# Patient Record
Sex: FEMALE | Race: AMERICAN INDIAN OR ALASKA NATIVE
[De-identification: names, ages, dates, MRNs, and addresses within clinical notes are randomized per-mention and may not be internally consistent; named-entity substitution may affect disease eponyms.]

---

## 2017-06-16 ENCOUNTER — HOSPITAL ENCOUNTER (INPATIENT)
Dept: HOSPITAL 5 - LD | Age: 22
LOS: 3 days | Discharge: HOME | End: 2017-06-19
Attending: OBSTETRICS & GYNECOLOGY | Admitting: OBSTETRICS & GYNECOLOGY
Payer: MEDICAID

## 2017-06-16 DIAGNOSIS — F12.90: ICD-10-CM

## 2017-06-16 DIAGNOSIS — Z3A.40: ICD-10-CM

## 2017-06-16 LAB
HCT VFR BLD CALC: 36 % (ref 30.3–42.9)
HGB BLD-MCNC: 12.2 GM/DL (ref 10.1–14.3)
MCH RBC QN AUTO: 32 PG (ref 28–32)
MCHC RBC AUTO-ENTMCNC: 34 % (ref 30–34)
MCV RBC AUTO: 95 FL (ref 79–97)
PLATELET # BLD: 167 K/MM3 (ref 140–440)
RBC # BLD AUTO: 3.8 M/MM3 (ref 3.65–5.03)
WBC # BLD AUTO: 8.9 K/MM3 (ref 4.5–11)

## 2017-06-16 PROCEDURE — 86901 BLOOD TYPING SEROLOGIC RH(D): CPT

## 2017-06-16 PROCEDURE — 86900 BLOOD TYPING SEROLOGIC ABO: CPT

## 2017-06-16 PROCEDURE — 99211 OFF/OP EST MAY X REQ PHY/QHP: CPT

## 2017-06-16 PROCEDURE — G0008 ADMIN INFLUENZA VIRUS VAC: HCPCS

## 2017-06-16 PROCEDURE — G0463 HOSPITAL OUTPT CLINIC VISIT: HCPCS

## 2017-06-16 PROCEDURE — 85018 HEMOGLOBIN: CPT

## 2017-06-16 PROCEDURE — C9250 ARTISS FIBRIN SEALANT: HCPCS

## 2017-06-16 PROCEDURE — 90471 IMMUNIZATION ADMIN: CPT

## 2017-06-16 PROCEDURE — 86850 RBC ANTIBODY SCREEN: CPT

## 2017-06-16 PROCEDURE — 85014 HEMATOCRIT: CPT

## 2017-06-16 PROCEDURE — 86592 SYPHILIS TEST NON-TREP QUAL: CPT

## 2017-06-16 PROCEDURE — 36415 COLL VENOUS BLD VENIPUNCTURE: CPT

## 2017-06-16 PROCEDURE — 85027 COMPLETE CBC AUTOMATED: CPT

## 2017-06-16 PROCEDURE — 90715 TDAP VACCINE 7 YRS/> IM: CPT

## 2017-06-16 RX ADMIN — SODIUM CHLORIDE, SODIUM LACTATE, POTASSIUM CHLORIDE, AND CALCIUM CHLORIDE SCH MLS/HR: .6; .31; .03; .02 INJECTION, SOLUTION INTRAVENOUS at 20:15

## 2017-06-16 RX ADMIN — SODIUM CHLORIDE, SODIUM LACTATE, POTASSIUM CHLORIDE, AND CALCIUM CHLORIDE SCH MLS/HR: .6; .31; .03; .02 INJECTION, SOLUTION INTRAVENOUS at 23:49

## 2017-06-16 RX ADMIN — OXYTOCIN SCH MLS/HR: 10 INJECTION, SOLUTION INTRAMUSCULAR; INTRAVENOUS at 18:29

## 2017-06-16 RX ADMIN — OXYTOCIN SCH MLS/HR: 10 INJECTION, SOLUTION INTRAMUSCULAR; INTRAVENOUS at 17:30

## 2017-06-16 RX ADMIN — OXYTOCIN SCH MLS/HR: 10 INJECTION, SOLUTION INTRAMUSCULAR; INTRAVENOUS at 16:43

## 2017-06-16 RX ADMIN — SODIUM CHLORIDE, SODIUM LACTATE, POTASSIUM CHLORIDE, AND CALCIUM CHLORIDE SCH MLS/HR: .6; .31; .03; .02 INJECTION, SOLUTION INTRAVENOUS at 16:42

## 2017-06-16 NOTE — PROGRESS NOTE
Assessment and Plan





- Patient Problems


(1) 40 weeks gestation of pregnancy


Current Visit: Yes   Status: Acute   





(2) GBS (group B Streptococcus carrier), +RV culture, currently pregnant


Current Visit: Yes   Status: Acute   


Plan to address problem: 


-s/p one dose of antibx. Second dose due at 2030 to 2100 pm.








(3) Non-reassuring electronic fetal monitoring tracing


Current Visit: Yes   Status: Acute   


Plan to address problem: 


-con't IOL. currently with Cat I tracing


-all questions were addressed and answered








Subjective





- Subjective


Date of service: 06/16/17


Principal diagnosis: 40.6 weeks for IOL


Interval history: 


doing well. +pain with contractions but does not need pain meds at this time.





Patient reports: fetal movement normal, no new complaints, no loss of fluid, no 

vaginal bleeding





Objective





- Vital Signs


Vital Signs: 


 Vital Signs - 12hr











  06/16/17 06/16/17 06/16/17





  15:53 16:14 17:04


 


Temperature  98.4 F 


 


Pulse Rate 118 H  93 H


 


Blood Pressure 111/81  123/70


 


Blood Pressure  111/81 





[Left Arm]   


 


O2 Sat by Pulse 97  





Oximetry   














  06/16/17 06/16/17 06/16/17





  17:17 17:31 17:46


 


Temperature   


 


Pulse Rate 94 H 88 88


 


Blood Pressure 119/80 114/77 110/74


 


Blood Pressure   





[Left Arm]   


 


O2 Sat by Pulse   





Oximetry   














  06/16/17 06/16/17 06/16/17





  18:02 18:18 18:31


 


Temperature   


 


Pulse Rate 75 82 79


 


Blood Pressure 118/74 114/68 112/71


 


Blood Pressure   





[Left Arm]   


 


O2 Sat by Pulse   





Oximetry   














  06/16/17





  18:47


 


Temperature 


 


Pulse Rate 75


 


Blood Pressure 110/72


 


Blood Pressure 





[Left Arm] 


 


O2 Sat by Pulse 





Oximetry 














- Exam


FHR: category 1


Cervical Dilatation: 4


Cervical Effacement Percentage: 70


Fetal station: -1


Uterine Contraction Pattern: Regular


Uterine Tone Measurement Phase: Resting


Uterine Contraction Intensity: Mild





- Labs


Labs: 


 Laboratory Results - last 24 hr











  06/16/17 06/16/17





  16:00 16:00


 


WBC  8.9 


 


RBC  3.80 


 


Hgb  12.2 


 


Hct  36.0 


 


MCV  95 


 


MCH  32 


 


MCHC  34 


 


RDW  13.8 


 


Plt Count  167 


 


Blood Type   B POSITIVE


 


Antibody Screen   TNR


 


FERNANDO Antibody Screen   Negative

## 2017-06-16 NOTE — EVENT NOTE
Date: 06/16/17


Called by nursing staff due to decels. Pt noted to be decreased by after 

epidural was given. Pt examined and cx is still unchanged. Pt advised that her 

labor is dysfunctional because after an hours of adequate contractions she has 

made very little cx and no change in the last hour. Pitocis d/c . Ephedrin was 

given and BP improved as well as fetal tracing. I d/w operative delivery via c/

s and she still desires haritha at this time. I stressed that currently the labor 

is dysfunctional and without the pitocin she will likely not have cx change 

however, if the tracing becomes cat II again w/o resolution with corrective 

measures again c/s would be recommended. Pt and family expressed understanding 

and questions were addressed and answered. I will have pt to sign c/s consents 

at this time and RN made aware of potential risk of c/s for fetal intolerance 

should now cat I tracing become cat II/III and pt is still remote from 

delivery. Several minutes spent at beside discussing options with pt and her 

family.

## 2017-06-16 NOTE — HISTORY AND PHYSICAL REPORT
History of Present Illness


Date of examination: 17


Date of admission: 


17 15:25





Chief complaint: 


nonreassuring fht in the office at post-dates testing appointment. FHT with 2 

minute late decel and several variables decels. Plan reviewed with Dr. Kurtz 

to begin IOL today.





History of present illness: 


EDC Confirmation:  06/10/2017








Past Pregnancy History 


   :      1


   Term Births:      0


   Premature Births:   0


   Living Children:   0


   Para:         0


   Mult. Births:      0


   Prev :   0


   Prev.  attempt?   0


   Aborta:      0


   Elect. Ab:      0


   Spont. Ab:      0


   Ectopics:      0








Past Medical History:


   Negative Past Medical History





Past Surgical History:


   Negative Past Surgical History








Social History:


   Unemployed


   Patient is single








Risk Factors: 





Smoked Tobacco Use:  Never smoker


Drug use:  yes


   Substance:  marijuana


   Comments:  Last use 2016


HIV high-risk behavior:  low risk


Alcohol use:  no








Past Medical History 


Surgery (Non-gyn): Negative Past Surgical History





Abnormal PAP: negative


Uterine Anomaly: negative





Social Hx: Unemployed


Patient is single








Infection History 


Hx of STD: chlamydia


HIV Risk Eval: low risk


Hepatitis B Risk Eval: low risk


Personal hx. of genital herpes: no


Partner hx. of genital herpes: no


Infection History Comments: +GC





Genetic History 


 Congenital Heart Defect:


    Mom: no  Dad: no


Canavan Disease:


    Mom: no  Dad: no


Thalassemia


    Mom: no  Dad: no


Neural Tube Defect


    Mom: no  Dad: no


Down's Syndrome


    Mom: no  Dad: no


Del-Sachs


    Mom: no  Dad: no


Sickle Cell Disease/Trait


    Mom: no  Dad: no


Hemophilia


    Mom: no  Dad: no


Muscular Dystrophy


    Mom: no  Dad: no


Cystic Fibrosis


    Mom: no  Dad: no


Chaves Chorea


    Mom: no  Dad: no


Mental Retardation


    Mom: no  Dad: no


Fragile X


    Mom: no  Dad: no


Other Genetic/Chromosomal Disorder


    Mom: no  Dad: no


Child w/other birth defect


    Mom: no  Dad: no





Enviromental Exposures 


Xray Exposure: no


Medication, drug, or alcohol use since LMP: no


Chemical/Other Exposure: no


Exposure to Cat Liter: no


Hx of Parvovirus (Fifth Disease): no


Active Medications (reviewed today):


PRENATAL FORMULA 27-1 MG ORAL TABS (PRENATAL VIT-FE FUMARATE-FA) 1 po q day as 

directed


PNV TABS 29-1 TABS (PRENATAL VIT-IRON CARBONYL-FA TABS) 





Current Allergies (reviewed today):


No known allergies








Past History


Past Medical History: no pertinent history


Past Surgical History: no surgical history





- Obstetrical History


Expected Date of Delivery: 06/10/17


Actual Gestation: 40 Week(s) 6 Day(s) 


: 1


Para: 0


Hx # Term Pregnancies: 0


Spontaneous Abortions: 0


Induced : 0


Number of Living Children: 0





Medications and Allergies


 Allergies











Allergy/AdvReac Type Severity Reaction Status Date / Time


 


No Known Allergies Allergy   Unverified 05/13/15 18:58











 Home Medications











 Medication  Instructions  Recorded  Confirmed  Last Taken  Type


 


Naproxen [Naprosyn TAB] 500 mg PO Q12H #20 tablet 05/13/15  Unknown Rx


 


traMADol [Ultram 50 MG tab] 50 mg PO Q6HR PRN #12 tablet 05/13/15  Unknown Rx











Active Meds: 


Active Medications





Ephedrine Sulfate (Ephedrine Sulfate)  10 mg IV Q2M PRN


   PRN Reason: Hypotension


   Stop: 17 15:39


Fentanyl (Sublimaze)  100 mcg IV Q2H PRN


   PRN Reason: Labor Pain


Ampicillin Sodium (Polycillin/Ns 1 Gm/50 Ml)  1 gm in 50 mls @ 100 mls/hr IV 

Q4HR GUSTAVO


   PRN Reason: Protocol


Ampicillin Sodium (Polycillin/Ns 2 Gm/100 Ml)  2 gm in 100 mls @ 100 mls/hr IV 

ONCE ONE


   PRN Reason: Protocol


   Stop: 17 16:33


Lactated Ringer's (Lactated Ringers)  1,000 mls @ 125 mls/hr IV AS DIRECT GUSTAVO


Oxytocin/Sodium Chloride (Pitocin/Ns 20 Unit/1000ml Drip)  20 units in 1,000 

mls @ 125 mls/hr IV AS DIRECT GUSTAVO


Oxytocin/Sodium Chloride (Pitocin/Ns 30 Unit/500ml)  30 units in 500 mls @ 4 mls

/hr IV TITR GUSTAVO


   PRN Reason: Protocol


Lidocaine (Xylocaine 2%)  20 ml INFILTRATI ONCE ONE


   Stop: 17 15:35


Mineral Oil (Mineral Oil)  30 ml PO QHS PRN


   PRN Reason: Constipation


Ondansetron HCl (Zofran)  4 mg IV Q8H PRN


   PRN Reason: Nausea And Vomiting


Terbutaline Sulfate (Brethine)  0.25 mg SUB-Q ONCE PRN


   PRN Reason: Hyperstimulation/Hypertonicity


   Stop: 17 15:35


Terbutaline Sulfate (Brethine)  0.25 mg IVP ONCE PRN


   PRN Reason: Hyperstimulation/Hypertonicity


   Stop: 17 15:35











Review of Systems


All systems: negative





- Physical Exam


Breasts: Positive: normal


Cardiovascular: Regular rate


Lungs: Positive: Clear to auscultation, Normal air movement


Abdomen: Positive: normal appearance, soft


Genitourinary (Female): Positive: normal external genitalia, normal perenium


Vulva: both: normal


Vagina: Positive: normal moisture


Uterus: Positive: normal size, normal contour


Anus/Rectum: Positive: normal perianal skin


Extremities: Positive: normal


Deep Tendon Reflex Grade: Normal +2





- Obstetrical


Uterine Contraction Monitor Mode: External


Cervical Dilatation: 3


Cervical Effacement Percentage: 70


Fetal station: -1


Uterine Contraction Pattern: Irregular


Uterine Tone Measurement Phase: Contraction


Uterine Contraction Intensity: Moderate





Results


All other labs normal.





Blood Type: B  (2016)


Rh Type: positive  (2016)


Rh Antibody Screen: negative  (2016)


Hgb: 12.7  (2016)


Hct: 36.0  (2016)


Rubella: immune  (2016)


RPR: nonreactive  (2016)


Hep B Surface Antigen: negative  (2016)


HIV: negative  (2016)


1 Hour GTT: 96  (2017)








Assessment and Plan


 22y/o primip sent from office for IOL d/t non reassuring fht with late decels. 

GBS +, favorable cervix. EFW today 7#14oz, BREA 20.  Plan for pit induction. 

orders in EMR. 








- Patient Problems


(1) 40 weeks gestation of pregnancy


Current Visit: Yes   Status: Acute   





(2) GBS (group B Streptococcus carrier), +RV culture, currently pregnant


Current Visit: Yes   Status: Acute   


Plan to address problem: 


Ampicillin q4h until delivered 








(3) Non-reassuring electronic fetal monitoring tracing


Current Visit: Yes   Status: Acute   


Plan to address problem: 


favorable cervix @ 40+6 weeks


Pitocin IOL


Cont efm/toco

## 2017-06-16 NOTE — PROGRESS NOTE
Assessment and Plan





- Patient Problems


(1) 40 weeks gestation of pregnancy


Current Visit: Yes   Status: Acute   





(2) GBS (group B Streptococcus carrier), +RV culture, currently pregnant


Current Visit: Yes   Status: Acute   





(3) Non-reassuring electronic fetal monitoring tracing


Current Visit: Yes   Status: Acute   





Subjective





- Subjective


Date of service: 06/16/17


Principal diagnosis: 40.6 weeks for IOL


Interval history: 


Pt doing well s/p epidrual and pain is well controlled. AROM with clear fluid 

and ISE and IUPC placed w/o difficulty with clear fluid noted in the cath.


Patient reports: fetal movement normal, no new complaints, no loss of fluid, no 

vaginal bleeding





Objective





- Vital Signs


Vital Signs: 


 Vital Signs - 12hr











  06/16/17 06/16/17 06/16/17





  15:53 16:14 17:04


 


Temperature  98.4 F 


 


Pulse Rate 118 H  93 H


 


Pulse Rate [   





Left From   





Monitor]   


 


Respiratory   





Rate   


 


Blood Pressure 111/81  123/70


 


Blood Pressure  111/81 





[Left Arm]   


 


O2 Sat by Pulse 97  





Oximetry   














  06/16/17 06/16/17 06/16/17





  17:17 17:31 17:46


 


Temperature   


 


Pulse Rate 94 H 88 88


 


Pulse Rate [   





Left From   





Monitor]   


 


Respiratory   





Rate   


 


Blood Pressure 119/80 114/77 110/74


 


Blood Pressure   





[Left Arm]   


 


O2 Sat by Pulse   





Oximetry   














  06/16/17 06/16/17 06/16/17





  18:02 18:18 18:31


 


Temperature   


 


Pulse Rate 75 82 79


 


Pulse Rate [   





Left From   





Monitor]   


 


Respiratory   





Rate   


 


Blood Pressure 118/74 114/68 112/71


 


Blood Pressure   





[Left Arm]   


 


O2 Sat by Pulse   





Oximetry   














  06/16/17 06/16/17 06/16/17





  18:47 19:02 19:16


 


Temperature   


 


Pulse Rate 75 77 80


 


Pulse Rate [   





Left From   





Monitor]   


 


Respiratory   





Rate   


 


Blood Pressure 110/72 116/74 118/77


 


Blood Pressure   





[Left Arm]   


 


O2 Sat by Pulse   





Oximetry   














  06/16/17 06/16/17 06/16/17





  19:31 19:52 19:57


 


Temperature   


 


Pulse Rate 78 81 74


 


Pulse Rate [   





Left From   





Monitor]   


 


Respiratory   





Rate   


 


Blood Pressure 121/85  


 


Blood Pressure   





[Left Arm]   


 


O2 Sat by Pulse  97 97





Oximetry   














  06/16/17 06/16/17 06/16/17





  20:01 20:02 20:07


 


Temperature   


 


Pulse Rate 67 73 84


 


Pulse Rate [   





Left From   





Monitor]   


 


Respiratory   





Rate   


 


Blood Pressure 118/76  


 


Blood Pressure   





[Left Arm]   


 


O2 Sat by Pulse  97 98





Oximetry   














  06/16/17 06/16/17 06/16/17





  20:12 20:15 20:16


 


Temperature   


 


Pulse Rate 70 76 74


 


Pulse Rate [   





Left From   





Monitor]   


 


Respiratory   





Rate   


 


Blood Pressure   121/77


 


Blood Pressure   





[Left Arm]   


 


O2 Sat by Pulse 97 94 





Oximetry   














  06/16/17 06/16/17 06/16/17





  20:30 20:31 20:35


 


Temperature   


 


Pulse Rate 77 72 101 H


 


Pulse Rate [   





Left From   





Monitor]   


 


Respiratory   





Rate   


 


Blood Pressure  112/82 


 


Blood Pressure   





[Left Arm]   


 


O2 Sat by Pulse 100  96





Oximetry   














  06/16/17 06/16/17 06/16/17





  20:40 20:42 20:44


 


Temperature   98.4 F


 


Pulse Rate 68 82 86


 


Pulse Rate [   85





Left From   





Monitor]   


 


Respiratory   18





Rate   


 


Blood Pressure  110/76 107/74


 


Blood Pressure   110/76





[Left Arm]   


 


O2 Sat by Pulse 99  97





Oximetry   














  06/16/17 06/16/17 06/16/17





  20:45 20:46 20:48


 


Temperature   


 


Pulse Rate 73 76 


 


Pulse Rate [   





Left From   





Monitor]   


 


Respiratory   





Rate   


 


Blood Pressure  110/75 106/69


 


Blood Pressure   





[Left Arm]   


 


O2 Sat by Pulse 97  





Oximetry   














  06/16/17 06/16/17 06/16/17





  20:50 20:52 20:54


 


Temperature   


 


Pulse Rate 87 75 78


 


Pulse Rate [   





Left From   





Monitor]   


 


Respiratory   





Rate   


 


Blood Pressure 103/71 109/71 105/69


 


Blood Pressure   





[Left Arm]   


 


O2 Sat by Pulse 95  





Oximetry   














  06/16/17 06/16/17 06/16/17





  20:55 20:56 20:58


 


Temperature   


 


Pulse Rate 85 80 81


 


Pulse Rate [   





Left From   





Monitor]   


 


Respiratory   





Rate   


 


Blood Pressure  105/68 104/67


 


Blood Pressure   





[Left Arm]   


 


O2 Sat by Pulse 97  





Oximetry   














  06/16/17 06/16/17 06/16/17





  21:00 21:05 21:08


 


Temperature   


 


Pulse Rate 72 69 70


 


Pulse Rate [   





Left From   





Monitor]   


 


Respiratory   





Rate   


 


Blood Pressure   107/69


 


Blood Pressure   





[Left Arm]   


 


O2 Sat by Pulse 96 97 





Oximetry   














  06/16/17 06/16/17 06/16/17





  21:10 21:15 21:20


 


Temperature   


 


Pulse Rate 82 69 73


 


Pulse Rate [   





Left From   





Monitor]   


 


Respiratory   





Rate   


 


Blood Pressure   


 


Blood Pressure   





[Left Arm]   


 


O2 Sat by Pulse 98 98 98





Oximetry   














  06/16/17 06/16/17 06/16/17





  21:25 21:30 21:35


 


Temperature   


 


Pulse Rate 75 72 69


 


Pulse Rate [   





Left From   





Monitor]   


 


Respiratory   





Rate   


 


Blood Pressure   


 


Blood Pressure   





[Left Arm]   


 


O2 Sat by Pulse 97 98 97





Oximetry   














  06/16/17 06/16/17 06/16/17





  21:40 21:45 21:48


 


Temperature   


 


Pulse Rate 71 67 69


 


Pulse Rate [   





Left From   





Monitor]   


 


Respiratory   





Rate   


 


Blood Pressure   111/66


 


Blood Pressure   





[Left Arm]   


 


O2 Sat by Pulse 97 97 





Oximetry   














- Exam


FHR: category 1


Cervical Dilatation: 5.5


Cervical Effacement Percentage: 95


Fetal station: -1 to 0


Uterine Contraction Pattern: Regular


Uterine Tone Measurement Phase: Resting


Uterine Contraction Intensity: Moderate





- Labs


Labs: 


 Laboratory Results - last 24 hr











  06/16/17 06/16/17





  16:00 16:00


 


WBC  8.9 


 


RBC  3.80 


 


Hgb  12.2 


 


Hct  36.0 


 


MCV  95 


 


MCH  32 


 


MCHC  34 


 


RDW  13.8 


 


Plt Count  167 


 


Blood Type   B POSITIVE


 


Antibody Screen   TNR


 


FERNANDO Antibody Screen   Negative

## 2017-06-16 NOTE — ANESTHESIA CONSULTATION
Anesthesia Consult and Med Hx


Date of service: 06/16/17





- Airway


Anesthetic Teeth Evaluation: Good


ROM Head & Neck: Adequate


Mental/Hyoid Distance: Adequate


Mallampati Class: Class II


Intubation Access Assessment: Probably Good





- Pulmonary Exam


CTA: Yes





- Cardiac Exam


Cardiac Exam: RRR





- Pre-Operative Health Status


ASA Pre-Surgery Classification: ASA2


Proposed Anesthetic Plan: Epidural, Spinal





- Pulmonary


Hx Asthma: No





- Cardiovascular System


Hx Hypertension: No





- Central Nervous System


Hx Seizures: No


Hx Psychiatric Problems: No





- Endocrine


Hx Renal Disease: No


Hx Hypothyroidism: No


Hx Hyperthyroidism: No





- Hematic


Hx Anemia: No


Hx Sickle Cell Disease: No





- Other Systems


Hx Alcohol Use: No





- Additional Comments


Anesthesia Medical History Comments: +IUP AT 40.6

## 2017-06-17 LAB
HCT VFR BLD CALC: 33.2 % (ref 30.3–42.9)
HGB BLD-MCNC: 11.3 GM/DL (ref 10.1–14.3)

## 2017-06-17 RX ADMIN — HYDROCODONE BITARTRATE AND ACETAMINOPHEN PRN EACH: 5; 325 TABLET ORAL at 22:51

## 2017-06-17 RX ADMIN — IBUPROFEN PRN MG: 800 TABLET, FILM COATED ORAL at 18:28

## 2017-06-17 RX ADMIN — FERROUS SULFATE TAB 325 MG (65 MG ELEMENTAL FE) SCH MG: 325 (65 FE) TAB at 16:27

## 2017-06-17 RX ADMIN — CEFAZOLIN SCH MLS/HR: 330 INJECTION, POWDER, FOR SOLUTION INTRAMUSCULAR; INTRAVENOUS at 10:53

## 2017-06-17 RX ADMIN — CEFAZOLIN SCH MLS/HR: 330 INJECTION, POWDER, FOR SOLUTION INTRAMUSCULAR; INTRAVENOUS at 18:32

## 2017-06-17 RX ADMIN — Medication SCH EACH: at 16:27

## 2017-06-17 RX ADMIN — HYDROCODONE BITARTRATE AND ACETAMINOPHEN PRN EACH: 5; 325 TABLET ORAL at 14:07

## 2017-06-17 NOTE — OPERATIVE REPORT
Operative Report


Operative Report: 


Date of procedure: 2017





Pre-operative diagnosis: 41 weeks' gestation


                                 To be strep positive


                                 Failure to progress





Post-operative diagnosis: Same +1+ meconium at the time of delivery





Procedure name(s): Primary low transverse  section via Pfannenstiel 

skin incision





Surgeon: Dr. Kurtz





Assistant: Certified surgical scrub assistant





Anesthesia: Epidural





EBL: 700 mL





Urine output: 500 mL of clear urine at the end of the procedure 





Fluids: 1500 mL





Findings: Liveborn male infant weight 7 lbs. 9 oz. Apgars of 7 and 8 at one and 

5 minutes


             1+ meconium noted at time of delivery


             Grossly normal fallopian tubes and ovaries bilaterally





Indications: Patient sent to the office for induction of labor due to having 

late decelerations on nonstress tests in the office today.  Patient progressed 

approximately 5 cm 90% efface and -1-0 station.  Patient's cervical exam 

remained unchanged for approximately 4 hours with cervical swelling noted in 

effacement decreasing to approximately 70%.  It was also noted.  As at this 

time that primary  section was recommended due to dysfunctional labor 

and failure to progress.  Patient agreed to  section at this time.  All 

risks benefits and alternatives were discussed with the patient.  Consents were 

signed and placed on the chart.





Procedure: Patient was taking to the operating room.  Patient was then prepped 

and draped in sterile fashion after anesthesia was found to be adequate.  A low 

transverse skin incision was made with the scalpel and carried down to the 

underlying layer of fascia with the Bovie.  The fascia was then incised in the 

midline and this incision was extended bilaterally with the Bovie.  The 

superior aspect of the fascia was grasped with Kocher clamps tented upward and 

dissected off of the anterior rectus muscles with the scalpel.  In similar 

fashion the inferior aspect of the fascia was grasped with Kocher clamps tented 

upward and dissected off of the anterior rectus muscles.  The rectus muscles 

were then bluntly divided in the midline.  The peritoneum was identified and 

entered into sharply.  The bladder blade was placed.  The bladder flap was 

created using the Metzenbaum scissors.  The bladder blade was replaced.  A 

lower transverse uterine incision was made with the scalpel and extended 

bilaterally with the bandage scissors.  Entry into the uterus yielded slightly 

stained meconium fluid.  The infant's head was then delivered atraumatically.  

The anterior shoulder and rest of infant delivered without difficulty.  The 

umbilical cord was clamped x2.  The cord was cut.  The infant was then placed 

in sterile bassinet.  The cord blood was collected.  The placenta was manually 

extracted in its entirety.  The uterus was exteriorized and cleared of all 

clots and debris.  The uterine incision was closed using 0 Vicryl in a running 

locking fashion.  A second imbricating layer of the same suture was then 

created.  The posterior cul-de-sac was copiously irrigated.  The uterus was 

returned to the abdomen.  Tisseel placed along the uterine incision with 

excellent hemostasis noted The gutters were also irrigated.  The anterior 

rectus muscles were reapproximated using 3-0 Vicryl.  The anterior rectus 

fascia was reapproximated using 0 Vicryl in a running fashion.  The 

subcuticular fat was reapproximated using 2-0 Vicryl in a running fashion.  The 

skin was reapproximated with 4-0 Monocryl with subcutaneous stitch.  The 

patient tolerated the procedure well.  Sponge lap and needle counts were all 

correct x3.  Patient was taken to the recovery room awake and in stable 

condition.

## 2017-06-18 RX ADMIN — IBUPROFEN PRN MG: 800 TABLET, FILM COATED ORAL at 23:44

## 2017-06-18 RX ADMIN — Medication SCH EACH: at 10:33

## 2017-06-18 RX ADMIN — HYDROCODONE BITARTRATE AND ACETAMINOPHEN PRN EACH: 5; 325 TABLET ORAL at 23:45

## 2017-06-18 RX ADMIN — IBUPROFEN PRN MG: 800 TABLET, FILM COATED ORAL at 17:44

## 2017-06-18 RX ADMIN — IBUPROFEN PRN MG: 800 TABLET, FILM COATED ORAL at 05:40

## 2017-06-18 RX ADMIN — HYDROCODONE BITARTRATE AND ACETAMINOPHEN PRN EACH: 5; 325 TABLET ORAL at 05:39

## 2017-06-18 RX ADMIN — IBUPROFEN PRN MG: 800 TABLET, FILM COATED ORAL at 18:21

## 2017-06-18 RX ADMIN — HYDROCODONE BITARTRATE AND ACETAMINOPHEN PRN EACH: 5; 325 TABLET ORAL at 18:20

## 2017-06-18 RX ADMIN — HYDROCODONE BITARTRATE AND ACETAMINOPHEN PRN EACH: 5; 325 TABLET ORAL at 10:33

## 2017-06-18 RX ADMIN — IBUPROFEN PRN MG: 800 TABLET, FILM COATED ORAL at 12:24

## 2017-06-18 RX ADMIN — HYDROCODONE BITARTRATE AND ACETAMINOPHEN PRN EACH: 5; 325 TABLET ORAL at 17:44

## 2017-06-18 RX ADMIN — FERROUS SULFATE TAB 325 MG (65 MG ELEMENTAL FE) SCH MG: 325 (65 FE) TAB at 10:33

## 2017-06-18 NOTE — PROGRESS NOTE
Assessment and Plan


 patient doing well, c/o normal discomforts. encouraged use of pain medications 

as needed. Lochia scant, dressing removed - incision D&I. H&H stable 11.3/33.2, 

VSSAF. Encouraged increase activity and shower today. Continue postop pathway.








- Patient Problems


(1)  delivery delivered


Current Visit: Yes   Status: Acute   





Subjective





- Subjective


Date of service: 17


Principal diagnosis: postop day #1 s/p primary c/s


Interval history: 


EDC Confirmation:  06/10/2017








Past Pregnancy History 


   :      1


   Term Births:      0


   Premature Births:   0


   Living Children:   0


   Para:         0


   Mult. Births:      0


   Prev :   0


   Prev.  attempt?   0


   Aborta:      0


   Elect. Ab:      0


   Spont. Ab:      0


   Ectopics:      0








Past Medical History:


   Negative Past Medical History





Past Surgical History:


   Negative Past Surgical History








Social History:


   Unemployed


   Patient is single








Risk Factors: 





Smoked Tobacco Use:  Never smoker


Drug use:  yes


   Substance:  marijuana


   Comments:  Last use 2016


HIV high-risk behavior:  low risk


Alcohol use:  no








Past Medical History 


Surgery (Non-gyn): Negative Past Surgical History





Abnormal PAP: negative


Uterine Anomaly: negative





Social Hx: Unemployed


Patient is single








Infection History 


Hx of STD: chlamydia


HIV Risk Eval: low risk


Hepatitis B Risk Eval: low risk


Personal hx. of genital herpes: no


Partner hx. of genital herpes: no


Infection History Comments: +GC





Genetic History 


 Congenital Heart Defect:


    Mom: no  Dad: no


Canavan Disease:


    Mom: no  Dad: no


Thalassemia


    Mom: no  Dad: no


Neural Tube Defect


    Mom: no  Dad: no


Down's Syndrome


    Mom: no  Dad: no


Del-Sachs


    Mom: no  Dad: no


Sickle Cell Disease/Trait


    Mom: no  Dad: no


Hemophilia


    Mom: no  Dad: no


Muscular Dystrophy


    Mom: no  Dad: no


Cystic Fibrosis


    Mom: no  Dad: no


Luce Chorea


    Mom: no  Dad: no


Mental Retardation


    Mom: no  Dad: no


Fragile X


    Mom: no  Dad: no


Other Genetic/Chromosomal Disorder


    Mom: no  Dad: no


Child w/other birth defect


    Mom: no  Dad: no





Enviromental Exposures 


Xray Exposure: no


Medication, drug, or alcohol use since LMP: no


Chemical/Other Exposure: no


Exposure to Cat Liter: no


Hx of Parvovirus (Fifth Disease): no


Active Medications (reviewed today):


PRENATAL FORMULA 27-1 MG ORAL TABS (PRENATAL VIT-FE FUMARATE-FA) 1 po q day as 

directed


PNV TABS 29-1 TABS (PRENATAL VIT-IRON CARBONYL-FA TABS) 





Current Allergies (reviewed today):


No known allergies





Patient reports: appetite normal, voiding normally, pain well controlled, flatus

, ambulating normally, no dizzy ambulation, no nauseated


[place]: doing well, bottle feeding (breast and bottle feeding)





Objective





- Vital Signs


Latest vital signs: 


 Vital Signs











  Temp Pulse Resp BP


 


 17 08:45  98.5 F  77  20  97/59


 


 17 00:10  98.1 F  77  18  100/65


 


 17 20:15  98.6 F  86  18  109/66


 


 17 16:30  98.4 F  78  16  97/65


 


 17 13:10  98.9 F  92 H  18  98/63








 Intake and Output











 17





 22:59 06:59 14:59


 


Intake Total 920 1480 120


 


Output Total 1100  600


 


Balance -180 1480 -480


 


Intake:   


 


   1000 


 


    D5lr 1,000 ml @ 125 mls/ 800 1000 





    hr IV AS DIRECT GUSTAVO Rx#:   





    [id number]   


 


  Oral 120 480 120


 


Output:   


 


  Urine 1100  600


 


    Void 1100  600


 


Other:   


 


  Total, Intake Amount 120 240 120


 


  Total, Output Amount 400  600


 


  # Voids   


 


    Void 1 1 1














- Exam


Breasts: Present: normal, breastfeeding


Cardiovascular: Present: Regular rate


Lungs: Present: Clear to auscultation, Normal air movement


Abdomen: Present: normal appearance, soft, normal bowel sounds


Uterus: Present: normal, firm, fundal height below umbilicus


Extremities: Present: normal


Incision: Present: normal, dry, intact

## 2017-06-19 VITALS — SYSTOLIC BLOOD PRESSURE: 112 MMHG | DIASTOLIC BLOOD PRESSURE: 62 MMHG

## 2017-06-19 RX ADMIN — HYDROCODONE BITARTRATE AND ACETAMINOPHEN PRN EACH: 5; 325 TABLET ORAL at 09:33

## 2017-06-19 RX ADMIN — IBUPROFEN PRN MG: 800 TABLET, FILM COATED ORAL at 12:11

## 2017-06-19 NOTE — DISCHARGE SUMMARY
Providers





- Providers


Date of Admission: 


17 15:25





Date of discharge: 17 (pt asks to d/c today if possible.)


Attending physician: 


ARNAV VALIENTE





Primary care physician: 


ARNAV VALIENTE








Hospitalization


Reason for admission: induction of labor


Delivery: 


Procedure: primary low transverse


Episiotomy: none


Laceration: none


Incision: dry, intact


Other postpartum procedures: none


Postpartum complications: none


Discharge diagnosis: IUP at term delivered


 baby: male


Hospital course: 


uncomplicated  section after failed IOL





Pt w/o complaint Request d/c today VSS FF below umb Lochia scant Incision D&I H&

H  Pt is asymptomatic Doing well s/p  section P: d/c today with 

instructions RTO 1 week postop care and circ of NB. RX provided.





Condition at discharge: Good


Disposition: DC-01 TO HOME OR SELFCARE





- Discharge Diagnoses


(1)  delivery delivered


Status: Acute   Comment: rto 1 week postop care   





Plan





- Discharge Medications


Prescriptions: 


Ibuprofen [Motrin] 800 mg PO Q8HR PRN #30 tablet


 PRN Reason: Pain


Lidocain2.5%/Prilocai2.5% [Emla] 2 gm TP ONCE #1 tube


oxyCODONE /ACETAMINOPHEN [Percocet 5/325] 1 tab PO Q4HR #30 tab





- Provider Discharge Summary


Activity: routine, no sex for 6 weeks, no heavy lifting 4 weeks, no strenuous 

exercise


Diet: routine


Instructions: routine


Additional instructions: 


[]  Smoking cessation referral if applicable(refer to patient education folder 

for contact #)


[]  Refer to [facility] Booklet








Call your doctor immediately for:


* Fever > 100.5


* Heavy vaginal bleeding ( >1 pad per hour)


* Severe persistent headache


* Shortness of breath


* Reddened, hot, painful area to leg or breast


* Drainage or odor from incision.





* Keep incision clean and dry at all times and follow doctor's instructions 

regarding bathing/showering











- Follow up plan


Follow up: 


ARNAV VALIENTE MD [Primary Care Provider] - 7 Days


(Congratulations!


Please call [phone] to schedule your postoperative visit and your son's 

circumcision. Bring the EMLA cream with you to his visit. 


Take your medication as prescribed.


Call with concerns.)

## 2020-01-08 ENCOUNTER — HOSPITAL ENCOUNTER (EMERGENCY)
Dept: HOSPITAL 5 - ED | Age: 25
LOS: 1 days | Discharge: HOME | End: 2020-01-09
Payer: SELF-PAY

## 2020-01-08 VITALS — DIASTOLIC BLOOD PRESSURE: 61 MMHG | SYSTOLIC BLOOD PRESSURE: 96 MMHG

## 2020-01-08 DIAGNOSIS — J02.9: ICD-10-CM

## 2020-01-08 DIAGNOSIS — J20.9: ICD-10-CM

## 2020-01-08 DIAGNOSIS — F12.10: ICD-10-CM

## 2020-01-08 DIAGNOSIS — Z79.899: ICD-10-CM

## 2020-01-08 DIAGNOSIS — J06.9: Primary | ICD-10-CM

## 2020-01-09 NOTE — EMERGENCY DEPARTMENT REPORT
ED General Adult HPI





- General


Chief complaint: Sore Throat


Stated complaint: HEAD/THROAT PAIN


Source: patient


Mode of arrival: Ambulatory


Limitations: No Limitations





- History of Present Illness


Initial comments: 





Patient is a 24-year-old AA female who presents to the ED with complete acute 

onset persistent severe sore throat, dysphagia, nasal and sinus congestion, dry 

cough, diffuse body aches and pains and headache for the last 2 days.  Patient 

denies dizziness, nausea, vomiting, diarrhea, abdominal pain, chest pain, 

shortness of breath, change in vision, syncope or dysuria and urinary frequency 

and urgency.


MD Complaint: sore throat,  diffuse body aches and pains, dry cough and headache


-: Sudden, days(s) (2)


Location: head, mouth, chest


Radiation: non-radiation


Severity scale (0 -10): 7


Quality: aching, sharp


Consistency: constant


Improves with: none


Worsens with: none


Associated Symptoms: denies other symptoms, cough, headaches, loss of appetite. 

denies: confusion, chest pain, diaphoresis, fever/chills, malaise, 

nausea/vomiting, rash, seizure, shortness of breath, syncope, weakness


Treatments Prior to Arrival: none





- Related Data


                                  Previous Rx's











 Medication  Instructions  Recorded  Last Taken  Type


 


Ibuprofen [Motrin] 800 mg PO Q8HR PRN #30 tablet 06/17/17 Unknown Rx


 


Lidocain2.5%/Prilocai2.5% [Emla] 2 gm TP ONCE #1 tube 06/17/17 Unknown Rx


 


oxyCODONE /ACETAMINOPHEN [Percocet 1 tab PO Q4HR #30 tab 06/17/17 Unknown Rx





5/325]    


 


Ibuprofen [Motrin] 600 mg PO Q8H PRN #20 tablet 01/09/20 Unknown Rx


 


Lidocaine Viscous 2% 10 ml PO Q6H PRN #120 ml 01/09/20 Unknown Rx


 


Penicillin V Potassium 500 mg PO Q6H #40 tablet 01/09/20 Unknown Rx


 


methylPREDNISolone [Medrol 4MG 4 mg PO DAILY #21 tab.ds.pk 01/09/20 Unknown Rx





DOSEPAK (21 tabs)]    











                                    Allergies











Allergy/AdvReac Type Severity Reaction Status Date / Time


 


No Known Allergies Allergy   Unverified 05/13/15 18:58














ED Review of Systems


ROS: 


Stated complaint: HEAD/THROAT PAIN


Other details as noted in HPI





Constitutional: chills, fever, malaise


Eyes: denies: eye pain, eye discharge, vision change


ENT: throat pain, congestion.  denies: ear pain


Respiratory: cough.  denies: shortness of breath, wheezing


Cardiovascular: denies: chest pain, palpitations


Endocrine: no symptoms reported


Gastrointestinal: denies: abdominal pain, nausea, vomiting, diarrhea


Genitourinary: denies: urgency, dysuria, discharge


Musculoskeletal: denies: back pain, joint swelling, arthralgia


Skin: denies: rash, lesions


Neurological: headache.  denies: weakness, paresthesias


Psychiatric: denies: anxiety, depression


Hematological/Lymphatic: denies: easy bleeding, easy bruising





ED Past Medical Hx





- Past Medical History


Previous Medical History?: No


Hx Hypertension: No


Hx Diabetes: No


Hx Deep Vein Thrombosis: No


Hx Renal Disease: No


Hx Sickle Cell Disease: No


Hx Seizures: No


Hx Asthma: No


Hx HIV: No





- Surgical History


Past Surgical History?: No





- Social History


Smoking Status: Never Smoker


Substance Use Type: Alcohol, Marijuana





- Medications


Home Medications: 


                                Home Medications











 Medication  Instructions  Recorded  Confirmed  Last Taken  Type


 


Ibuprofen [Motrin] 800 mg PO Q8HR PRN #30 tablet 06/17/17  Unknown Rx


 


Lidocain2.5%/Prilocai2.5% [Emla] 2 gm TP ONCE #1 tube 06/17/17  Unknown Rx


 


oxyCODONE /ACETAMINOPHEN [Percocet 1 tab PO Q4HR #30 tab 06/17/17  Unknown Rx





5/325]     


 


Ibuprofen [Motrin] 600 mg PO Q8H PRN #20 tablet 01/09/20  Unknown Rx


 


Lidocaine Viscous 2% 10 ml PO Q6H PRN #120 ml 01/09/20  Unknown Rx


 


Penicillin V Potassium 500 mg PO Q6H #40 tablet 01/09/20  Unknown Rx


 


methylPREDNISolone [Medrol 4MG 4 mg PO DAILY #21 tab.ds.pk 01/09/20  Unknown Rx





DOSEPAK (21 tabs)]     














ED Physical Exam





- General


Limitations: No Limitations


General appearance: alert, in no apparent distress





- Head


Head exam: Present: atraumatic, normocephalic, normal inspection





- Eye


Eye exam: Present: normal appearance, PERRL, EOMI


Pupils: Present: normal accommodation





- ENT


ENT exam: Present: normal exam, mucous membranes moist, TM's normal bilaterally,

normal external ear exam, other (grossly congested nasal passages, erythematous 

oropharynx and tonsils)





- Neck


Neck exam: Present: normal inspection, full ROM, lymphadenopathy





- Respiratory


Respiratory exam: Present: normal lung sounds bilaterally.  Absent: respiratory 

distress, wheezes, rales, chest wall tenderness, accessory muscle use, decreased

breath sounds





- Cardiovascular


Cardiovascular Exam: Present: regular rate, normal rhythm, normal heart sounds. 

Absent: systolic murmur, diastolic murmur, rubs, gallop





- GI/Abdominal


GI/Abdominal exam: Present: soft, normal bowel sounds.  Absent: tenderness, 

guarding, hyperactive bowel sounds, hypoactive bowel sounds, organomegaly





- Extremities Exam


Extremities exam: Present: normal inspection, full ROM, normal capillary refill





- Back Exam


Back exam: Present: normal inspection, full ROM.  Absent: tenderness, CVA 

tenderness (R), CVA tenderness (L), muscle spasm, paraspinal tenderness





- Neurological Exam


Neurological exam: Present: alert, oriented X3, CN II-XII intact, normal gait, 

reflexes normal





- Psychiatric


Psychiatric exam: Present: normal affect, normal mood





- Skin


Skin exam: Present: warm, dry, intact, normal color.  Absent: rash





ED Course





                                   Vital Signs











  01/08/20





  23:18


 


Temperature 97.7 F


 


Pulse Rate 77


 


Respiratory 16





Rate 


 


Blood Pressure 96/61


 


O2 Sat by Pulse 100





Oximetry 














ED Medical Decision Making





- Medical Decision Making





This is a 24-year-old female presented to the ED with common of acute onset 

persistent nasal and sinus congestion, frontal sinus pressure, severe sore 

throat with dysphagia, and a sinus headache, dry cough, diffuse body aches and 

pains for the last 2 days.  In the ED, patient is alert and oriented 3 and is n

ot in distress.  Patient was treated for pain in the ED and discharged home on 

medications, was advised to return to the ED immediately if symptoms get worse. 

Patient was advised to follow-up with her primary care physician in 7-10 days 

for reevaluation.  Patient was advised to return to the ED immediately if 

symptoms get worse.





- Differential Diagnosis


strep pharyngitis; URI; Bronchitis; Flu


Critical care attestation.: 


If time is entered above; I have spent that time in minutes in the direct care 

of this critically ill patient, excluding procedure time.








ED Disposition


Clinical Impression: 


 Acute upper respiratory infection





Acute pharyngitis


Qualifiers:


 Pharyngitis/tonsillitis etiology: other specified organisms Qualified Code(s): 

J02.8 - Acute pharyngitis due to other specified organisms





Acute bronchitis


Qualifiers:


 Bronchitis organism: other organism Qualified Code(s): J20.8 - Acute bronchitis

due to other specified organisms





Disposition: DC-01 TO HOME OR SELFCARE


Is pt being admited?: No


Does the pt Need Aspirin: No


Condition: Stable


Instructions:  Acute Bronchitis (ED), Pharyngitis (ED), Upper Respiratory 

Infection (ED)


Additional Instructions: 


Take medications with food, drink plenty of fluids and follow up with primary 

care physician in 7-10 days for reevaluation.  Return to the ED immediately if 

symptoms get worse.


Prescriptions: 


Lidocaine Viscous 2% 10 ml PO Q6H PRN #120 ml


 PRN Reason: Pain , Severe (7-10)


methylPREDNISolone [Medrol 4MG DOSEPAK (21 tabs)] 4 mg PO DAILY #21 tab.ds.pk


Ibuprofen [Motrin] 600 mg PO Q8H PRN #20 tablet


 PRN Reason: Pain


Penicillin V Potassium 500 mg PO Q6H #40 tablet


Referrals: 


[facility] [Outside] - 7-10 days


Time of Disposition: 05:37


Print Language: ENGLISH